# Patient Record
Sex: MALE | Race: WHITE | NOT HISPANIC OR LATINO | Employment: FULL TIME | URBAN - METROPOLITAN AREA
[De-identification: names, ages, dates, MRNs, and addresses within clinical notes are randomized per-mention and may not be internally consistent; named-entity substitution may affect disease eponyms.]

---

## 2024-01-22 ENCOUNTER — HOSPITAL ENCOUNTER (EMERGENCY)
Facility: HOSPITAL | Age: 39
Discharge: HOME | End: 2024-01-22
Attending: EMERGENCY MEDICINE
Payer: OTHER MISCELLANEOUS

## 2024-01-22 VITALS
HEART RATE: 93 BPM | TEMPERATURE: 98.3 F | DIASTOLIC BLOOD PRESSURE: 81 MMHG | SYSTOLIC BLOOD PRESSURE: 143 MMHG | OXYGEN SATURATION: 100 % | RESPIRATION RATE: 16 BRPM | WEIGHT: 229.28 LBS
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
## 2024-01-22 DIAGNOSIS — Z77.21 EXPOSURE TO BLOOD OR BODY FLUID: ICD-10-CM
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
## 2024-01-22 DIAGNOSIS — Z77.21 EXPOSURE TO BODY FLUIDS BY CONTAMINATED HYPODERMIC NEEDLE STICK: ICD-10-CM

## 2024-01-22 LAB
ALBUMIN SERPL BCP-MCNC: 4.9 G/DL (ref 3.5–5)
ALP SERPL-CCNC: 85 U/L (ref 34–104)
ALT SERPL W P-5'-P-CCNC: 36 U/L (ref 7–52)
ALT SERPL W P-5'-P-CCNC: 36 U/L (ref 7–52)
ANION GAP SERPL CALCULATED.3IONS-SCNC: 7 MMOL/L
AST SERPL W P-5'-P-CCNC: 31 U/L (ref 13–39)
BASOPHILS # BLD AUTO: 0.04 THOUSANDS/ÂΜL (ref 0–0.1)
BASOPHILS NFR BLD AUTO: 1 % (ref 0–1)
BILIRUB SERPL-MCNC: 0.48 MG/DL (ref 0.2–1)
BUN SERPL-MCNC: 18 MG/DL (ref 5–25)
CALCIUM SERPL-MCNC: 9.3 MG/DL (ref 8.4–10.2)
CHLORIDE SERPL-SCNC: 105 MMOL/L (ref 96–108)
CO2 SERPL-SCNC: 28 MMOL/L (ref 21–32)
CREAT SERPL-MCNC: 0.71 MG/DL (ref 0.6–1.3)
EOSINOPHIL # BLD AUTO: 0.06 THOUSAND/ÂΜL (ref 0–0.61)
EOSINOPHIL NFR BLD AUTO: 1 % (ref 0–6)
ERYTHROCYTE [DISTWIDTH] IN BLOOD BY AUTOMATED COUNT: 12.6 % (ref 11.6–15.1)
GFR SERPL CREATININE-BSD FRML MDRD: 117 ML/MIN/1.73SQ M
GLUCOSE SERPL-MCNC: 99 MG/DL (ref 65–140)
HBV CORE AB SER QL: NORMAL
HBV SURFACE AB SER-ACNC: <3 MIU/ML
HBV SURFACE AG SER QL: NORMAL
HCT VFR BLD AUTO: 44.2 % (ref 36.5–49.3)
HCV AB SER QL: NORMAL
HGB BLD-MCNC: 14.6 G/DL (ref 12–17)
HIV 1+2 AB+HIV1 P24 AG SERPL QL IA: NORMAL
HIV 2 AB SERPL QL IA: NORMAL
HIV1 AB SERPL QL IA: NORMAL
HIV1 P24 AG SERPL QL IA: NORMAL
IMM GRANULOCYTES # BLD AUTO: 0.03 THOUSAND/UL (ref 0–0.2)
IMM GRANULOCYTES NFR BLD AUTO: 0 % (ref 0–2)
LYMPHOCYTES # BLD AUTO: 1.14 THOUSANDS/ÂΜL (ref 0.6–4.47)
LYMPHOCYTES NFR BLD AUTO: 16 % (ref 14–44)
MCH RBC QN AUTO: 29.9 PG (ref 26.8–34.3)
MCHC RBC AUTO-ENTMCNC: 33 G/DL (ref 31.4–37.4)
MCV RBC AUTO: 90 FL (ref 82–98)
MONOCYTES # BLD AUTO: 0.6 THOUSAND/ÂΜL (ref 0.17–1.22)
MONOCYTES NFR BLD AUTO: 8 % (ref 4–12)
NEUTROPHILS # BLD AUTO: 5.49 THOUSANDS/ÂΜL (ref 1.85–7.62)
NEUTS SEG NFR BLD AUTO: 74 % (ref 43–75)
NRBC BLD AUTO-RTO: 0 /100 WBCS
PLATELET # BLD AUTO: 247 THOUSANDS/UL (ref 149–390)
PMV BLD AUTO: 10.9 FL (ref 8.9–12.7)
POTASSIUM SERPL-SCNC: 3.6 MMOL/L (ref 3.5–5.3)
PROT SERPL-MCNC: 7.7 G/DL (ref 6.4–8.4)
RBC # BLD AUTO: 4.89 MILLION/UL (ref 3.88–5.62)
SODIUM SERPL-SCNC: 140 MMOL/L (ref 135–147)
WBC # BLD AUTO: 7.36 THOUSAND/UL (ref 4.31–10.16)

## 2024-01-22 PROCEDURE — 80053 COMPREHEN METABOLIC PANEL: CPT

## 2024-01-22 PROCEDURE — 99283 EMERGENCY DEPT VISIT LOW MDM: CPT

## 2024-01-22 PROCEDURE — 90715 TDAP VACCINE 7 YRS/> IM: CPT

## 2024-01-22 PROCEDURE — 85025 COMPLETE CBC W/AUTO DIFF WBC: CPT

## 2024-01-22 PROCEDURE — 36415 COLL VENOUS BLD VENIPUNCTURE: CPT

## 2024-01-22 PROCEDURE — 96372 THER/PROPH/DIAG INJ SC/IM: CPT

## 2024-01-22 PROCEDURE — 90371 HEP B IG IM: CPT

## 2024-01-22 PROCEDURE — 87389 HIV-1 AG W/HIV-1&-2 AB AG IA: CPT

## 2024-01-22 PROCEDURE — 90471 IMMUNIZATION ADMIN: CPT

## 2024-01-22 PROCEDURE — 90746 HEPB VACCINE 3 DOSE ADULT IM: CPT

## 2024-01-22 PROCEDURE — 90472 IMMUNIZATION ADMIN EACH ADD: CPT

## 2024-01-22 PROCEDURE — 86706 HEP B SURFACE ANTIBODY: CPT

## 2024-01-22 PROCEDURE — 86704 HEP B CORE ANTIBODY TOTAL: CPT

## 2024-01-22 PROCEDURE — 87340 HEPATITIS B SURFACE AG IA: CPT

## 2024-01-22 PROCEDURE — 84460 ALANINE AMINO (ALT) (SGPT): CPT

## 2024-01-22 PROCEDURE — 86803 HEPATITIS C AB TEST: CPT

## 2024-01-22 RX ORDER — ONDANSETRON 4 MG/1
4 TABLET, ORALLY DISINTEGRATING ORAL EVERY 6 HOURS PRN
Qty: 30 TABLET | Refills: 0 | Status: SHIPPED | OUTPATIENT
Start: 2024-01-22 | End: 2024-02-21

## 2024-01-22 RX ORDER — BICTEGRAVIR SODIUM, EMTRICITABINE, AND TENOFOVIR ALAFENAMIDE FUMARATE 50; 200; 25 MG/1; MG/1; MG/1
1 TABLET ORAL
Qty: 22 TABLET | Refills: 0 | Status: SHIPPED | OUTPATIENT
Start: 2024-01-28 | End: 2024-02-19

## 2024-01-22 RX ADMIN — TETANUS TOXOID, REDUCED DIPHTHERIA TOXOID AND ACELLULAR PERTUSSIS VACCINE, ADSORBED 0.5 ML: 5; 2.5; 8; 8; 2.5 SUSPENSION INTRAMUSCULAR at 13:31

## 2024-01-22 RX ADMIN — Medication 1 EACH: at 15:10

## 2024-01-22 RX ADMIN — HEPATITIS B VACCINE (RECOMBINANT) 20 MCG: 20 INJECTION, SUSPENSION INTRAMUSCULAR at 15:13

## 2024-01-22 RX ADMIN — HEPATITIS B IMMUNE GLOBULIN (HUMAN) 6.2 ML: 1560 LIQUID INTRAMUSCULAR at 15:15

## 2024-01-22 NOTE — Clinical Note
Chan Saunders was seen and treated in our emergency department on 1/22/2024.                Diagnosis: Exposure to blood-borne    Chan  may return to work on return date.    He may return on this date: 01/23/2024         If you have any questions or concerns, please don't hesitate to call.      Roosevelt Penaloza PA-C    ______________________________           _______________          _______________  Hospital Representative                              Date                                Time

## 2024-01-22 NOTE — DISCHARGE INSTRUCTIONS
Patient advised to follow-up with PCP for follow-up appointments/continued immunization.  Patient advised to contact place of employment and follow-up proper paperwork for exposure to body fluids.  Patient should continue to follow blood-borne pathogen exposure.  Patient advised to take medication as prescribed.  Patient advised to return to the ED with any worsening redness, worsening pain, chest pain, shortness of breath, abdominal pain, visual disturbances, fever. Patient is advised to file Work Related Employee Injury/Illness report.

## 2024-01-22 NOTE — ED PROVIDER NOTES
History  Chief Complaint   Patient presents with    Body Fluid Exposure     Pt reports stuck by unknown needle at work on accident taking out someone's trash.      Patient is a 38-year-old male who presented to the ED with a chief complaint of needlestick injury 1 hour ago.  Stated that he was taking out some transfers work at [employer] when he put his opposing hand underneath a trash bag and felt a pinch in his finger.  He stated that right after he found out that this was a needle that stuck in the finger.  He said that he squeezed the blood out of his finger after the initial injury happened.  He then proceeded to rinse and wash his hands with soap and water for around 5 minutes prior to the injury.  He stated that the injury started bleeding immediately.  Patient states that he is not currently in any pain.  Patient stated there is a possibility that they could find the source patient from the needlestick but would have to go through camera videos from his workplace.  Patient is unsure of vaccine history.  Patient denies any shortness of breath, chest pain, abdominal pain, vomiting, diarrhea, fevers, visual disturbances.        None       History reviewed. No pertinent past medical history.    History reviewed. No pertinent surgical history.    History reviewed. No pertinent family history.  I have reviewed and agree with the history as documented.    E-Cigarette/Vaping     E-Cigarette/Vaping Substances     Social History     Tobacco Use    Smoking status: Never    Smokeless tobacco: Never       Review of Systems   Constitutional:  Negative for chills, diaphoresis, fatigue and fever.   HENT:  Negative for congestion, ear pain, postnasal drip, rhinorrhea, sinus pressure, sinus pain and sore throat.    Eyes:  Negative for pain and discharge.   Respiratory:  Negative for cough and shortness of breath.    Cardiovascular:  Negative for chest pain and palpitations.   Gastrointestinal:  Negative for  abdominal pain, blood in stool, constipation, diarrhea, nausea and vomiting.   Genitourinary:  Negative for difficulty urinating, dysuria, flank pain, hematuria, penile pain and testicular pain.   Musculoskeletal:  Negative for arthralgias, back pain, myalgias and neck pain.   Skin:  Positive for wound. Negative for color change and rash.   Neurological:  Negative for dizziness, syncope, light-headedness, numbness and headaches.       Physical Exam  Physical Exam  Vitals and nursing note reviewed.   Constitutional:       General: He is not in acute distress.     Appearance: Normal appearance. He is well-developed and normal weight. He is not ill-appearing, toxic-appearing or diaphoretic.   HENT:      Head: Normocephalic and atraumatic.      Right Ear: External ear normal.      Left Ear: External ear normal.      Nose: Nose normal.      Mouth/Throat:      Mouth: Mucous membranes are moist.   Eyes:      Conjunctiva/sclera: Conjunctivae normal.   Cardiovascular:      Rate and Rhythm: Normal rate and regular rhythm.      Pulses: Normal pulses.      Heart sounds: Normal heart sounds. No murmur heard.  Pulmonary:      Effort: Pulmonary effort is normal. No respiratory distress.      Breath sounds: Normal breath sounds.   Abdominal:      General: Bowel sounds are normal.      Palpations: Abdomen is soft.      Tenderness: There is no abdominal tenderness.   Musculoskeletal:         General: No swelling or tenderness. Normal range of motion.      Cervical back: Normal range of motion and neck supple.      Comments: The pin sites area of erythema on the right second digit.  Patient has the area circled with marker.  No extending erythema or active bleeding on exam.  Full range of motion and is neurovascularly intact.   Skin:     General: Skin is warm and dry.      Capillary Refill: Capillary refill takes less than 2 seconds.      Findings: Lesion present. No rash.   Neurological:      General: No focal deficit present.       Mental Status: He is alert and oriented to person, place, and time.   Psychiatric:         Mood and Affect: Mood normal.         Vital Signs  ED Triage Vitals [01/22/24 1211]   Temperature Pulse Respirations Blood Pressure SpO2   98.3 °F (36.8 °C) 93 16 143/81 100 %      Temp Source Heart Rate Source Patient Position - Orthostatic VS BP Location FiO2 (%)   Oral Monitor Sitting Right arm --      Pain Score       --           Vitals:    01/22/24 1211   BP: 143/81   Pulse: 93   Patient Position - Orthostatic VS: Sitting         Visual Acuity      ED Medications  Medications   tetanus-diphtheria-acellular pertussis (BOOSTRIX) IM injection 0.5 mL (0.5 mL Intramuscular Given 1/22/24 1331)   hepatitis B immune globulin (Nabi-HB) IM injection 6.2 mL (6.2 mL Intramuscular Given 1/22/24 1515)   hepatitis B vac (recombinant) IM injection 20 mcg (20 mcg Intramuscular Given 1/22/24 1513)   bictegravir-emtricitab-tenofovir alafenamide, ondansetron (Biktarvy, Zofran) PEP Dose Pack 1 each (1 each Does not apply Given 1/22/24 1510)       Diagnostic Studies  Results Reviewed       Procedure Component Value Units Date/Time    Hepatitis B surface antibody [[id number]] Collected: 01/22/24 1316    Lab Status: Final result Specimen: Blood from Arm, Left Updated: 01/22/24 2303     Hep B S Ab <3.00 mIU/mL     Hepatitis B core antibody, total [[id number]]  (Normal) Collected: 01/22/24 1316    Lab Status: Final result Specimen: Blood from Arm, Left Updated: 01/22/24 2253     Hep B Core Total Ab Non-reactive    Hepatitis C antibody [[id number]]  (Normal) Collected: 01/22/24 1316    Lab Status: Final result Specimen: Blood from Arm, Left Updated: 01/22/24 2121     Hepatitis C Ab Non-reactive    Hepatitis B surface antigen [[id number]]  (Normal) Collected: 01/22/24 1316    Lab Status: Final result Specimen: Blood from Arm, Left Updated: 01/22/24 2120     Hepatitis B Surface Ag Non-reactive    HIV 1/2 AG/AB w Reflex [facility] for 2 yr old and above  [[id number]]  (Normal) Collected: 01/22/24 1316    Lab Status: Final result Specimen: Blood from Arm, Left Updated: 01/22/24 2115     HIV-1 p24 Antigen Non-Reactive     HIV-1 Antibody Non-Reactive     HIV-2 Antibody Non-Reactive     HIV Ag-Ab 5th Gen Non-Reactive    Narrative:      This 5th generation HIV Ag-Ab assay is a multiplex flow immunoassay intended for qualitative detection and differentiation of HIV-1 p24 antigen, HIV-1 (groups M and O) antibodies, and HIV-2 antibodies in human serum.  This assay is intended as an aid in the diagnosis of infection with HIV-1 and/or HIV-2, including acute (primary) HIV-1 infection. The test is validated for adult patients, including pregnant women, and in pediatric patients as young as two years of age. The performance of this assay has not been established for neonates.      Comprehensive metabolic panel [[id number]] Collected: 01/22/24 1359    Lab Status: Final result Specimen: Blood from Hand, Right Updated: 01/22/24 1437     Sodium 140 mmol/L      Potassium 3.6 mmol/L      Chloride 105 mmol/L      CO2 28 mmol/L      ANION GAP 7 mmol/L      BUN 18 mg/dL      Creatinine 0.71 mg/dL      Glucose 99 mg/dL      Calcium 9.3 mg/dL      AST 31 U/L      ALT 36 U/L      Alkaline Phosphatase 85 U/L      Total Protein 7.7 g/dL      Albumin 4.9 g/dL      Total Bilirubin 0.48 mg/dL      eGFR 117 ml/min/1.73sq m     Narrative:      National Kidney Disease Foundation guidelines for Chronic Kidney Disease (CKD):     Stage 1 with normal or high GFR (GFR > 90 mL/min/1.73 square meters)    Stage 2 Mild CKD (GFR = 60-89 mL/min/1.73 square meters)    Stage 3A Moderate CKD (GFR = 45-59 mL/min/1.73 square meters)    Stage 3B Moderate CKD (GFR = 30-44 mL/min/1.73 square meters)    Stage 4 Severe CKD (GFR = 15-29 mL/min/1.73 square meters)    Stage 5 End Stage CKD (GFR <15 mL/min/1.73 square meters)  Note: GFR calculation is accurate only with a steady state creatinine    CBC and differential  [[id number]] Collected: 01/22/24 1359    Lab Status: Final result Specimen: Blood from Hand, Right Updated: 01/22/24 1406     WBC 7.36 Thousand/uL      RBC 4.89 Million/uL      Hemoglobin 14.6 g/dL      Hematocrit 44.2 %      MCV 90 fL      MCH 29.9 pg      MCHC 33.0 g/dL      RDW 12.6 %      MPV 10.9 fL      Platelets 247 Thousands/uL      nRBC 0 /100 WBCs      Neutrophils Relative 74 %      Immat GRANS % 0 %      Lymphocytes Relative 16 %      Monocytes Relative 8 %      Eosinophils Relative 1 %      Basophils Relative 1 %      Neutrophils Absolute 5.49 Thousands/µL      Immature Grans Absolute 0.03 Thousand/uL      Lymphocytes Absolute 1.14 Thousands/µL      Monocytes Absolute 0.60 Thousand/µL      Eosinophils Absolute 0.06 Thousand/µL      Basophils Absolute 0.04 Thousands/µL     ALT [[id number]]  (Normal) Collected: 01/22/24 1316    Lab Status: Final result Specimen: Blood from Arm, Left Updated: 01/22/24 1348     ALT 36 U/L                    No orders to display              Procedures  Procedures         ED Course                                             Medical Decision Making  Patient is a 38-year-old male who presented to the ED with a needlestick injury.  Was picking up trash at work and put his hand underneath a trash bag and noticed that he was stuck with a needle.  Physical exam shows pinhole sized area of erythema.  No extension of the erythema.  No bleeding on exam.  Patient stated that bleeding was stopped immediately after.  Patient stated that he squeeze blood out of the lesion initially when exposed.  Stated that he washes hands for around 5 minutes with soap and water after the initial needlestick injury.  Patient had the needle that he was stuck with.  Needle as a pen needle used for subcutaneous injection of insulin.  Patient denies known source.  Stated that he may be able to find the source if his work were able to track the cameras to see who is trash the needle was in.  I consulted  on-call infectious disease who stated to follow blood-borne pathogen exposure.  Due to not having a source and patient not having known vaccine status for hepatitis B.  Hepatitis immunoglobulin/hepatitis B vaccination given in the ED.  Patient told to follow-up with PCP/urgent care in a month for next hepatitis B vaccination.  Patient also given tetanus vaccination as the last tetanus was 9 years ago.  Patient started on PEP prophylaxis for possible HIV infection.  Postexposure labs ordered on patient.  Patient was told to follow-up with his work to make sure all paperwork for postexposure injury/illness is filed.  Patient told to call PCP to follow-up for postexposure prophylaxis.  Patient also given prescription for Zofran for possibility of nausea with postexposure medications.  Patient told that any positive results will be relayed to him.  Patient understood diagnosis and treatment plan and had no further questions.  Outpatient medication sent to the patient pharmacy.  Patient was discharged in stable condition.  Patient was told to return to the ED with any fevers, abdominal pain, vomiting, diarrhea, increasing erythema, drainage, chest pain, shortness of breath, and worsening conditions.    Ddx-needlestick injury, blood-borne pathogen exposure, hepatitis B, hepatitis C, HIV    Amount and/or Complexity of Data Reviewed  External Data Reviewed: labs and notes.  Labs: ordered.     Details: Postexposure labs, baseline CBC CMP,  Discussion of management or test interpretation with external provider(s): Infectious disease was consulted during visit via [facility].    Risk  OTC drugs.  Prescription drug management.             Disposition  Final diagnoses:   Exposure to body fluids by contaminated hypodermic needle stick   Exposure to blood or body fluid   Needlestick injury accident with exposure to body fluid     Time reflects when diagnosis was documented in both MDM as applicable and the Disposition within  this note       Time User Action Codes Description Comment    1/22/2024  1:41 PM Ca Roosevelt Add [Z77.21,  W46.1XXA] Exposure to body fluids by contaminated hypodermic needle stick     1/22/2024  1:49 PM Roosevelt Penaloza Add [Z77.21] Exposure to blood or body fluid     1/22/2024  1:49 PM Roosevelt Penaloza Add [W46.1XXA] Needlestick injury accident with exposure to body fluid     1/22/2024  1:49 PM Roosevelt Penaloza Modify [Z77.21,  W46.1XXA] Exposure to body fluids by contaminated hypodermic needle stick     1/22/2024  1:49 PM Roosevelt Penaloza Modify [W46.1XXA] Needlestick injury accident with exposure to body fluid           ED Disposition       ED Disposition   Discharge    Condition   Stable    Date/Time   Mon Jan 22, 2024  1:49 PM    Comment   Chan Badilloillo discharge to home/self care.                   Follow-up Information       Follow up With Specialties Details Why Contact Info Additional Information    Slick Darden MD Internal Medicine Call today blood bourne pathogen exposure [address] [place] [address] [phone]       [facility]   [address]  [facility] [address]  [phone] [facility], [address] [place] [address], [place], [place], [address]   [phone]    [facility] Call today  [address] [facility] [id number] [phone] [facility], [address] [place] [address], [place], [address], [phone]            Discharge Medication List as of 1/22/2024  3:31 PM        START taking these medications    Details   bictegravir-emtricitab-tenofovir alafenamide (Biktarvy) -25 MG tablet Take 1 tablet by mouth daily with breakfast for 22 days Do not start before January 28, 2024., Starting Sun 1/28/2024, Until Mon 2/19/2024, Normal      ondansetron (ZOFRAN-ODT) 4 mg disintegrating tablet  Take 1 tablet (4 mg total) by mouth every 6 (six) hours as needed for nausea or vomiting, Starting Mon 1/22/2024, Until Wed 2/21/2024 at 2359, Normal             No discharge procedures on file.    PDMP Review       None            ED Provider  Electronically Signed by             Roosevelt Penaloza PA-C  01/24/24 0792

## 2024-02-14 ENCOUNTER — APPOINTMENT (OUTPATIENT)
Dept: LAB | Facility: MEDICAL CENTER | Age: 39
End: 2024-02-14

## 2024-02-14 ENCOUNTER — OCCMED (OUTPATIENT)
Dept: URGENT CARE | Facility: MEDICAL CENTER | Age: 39
End: 2024-02-14
Payer: OTHER MISCELLANEOUS

## 2024-02-14 DIAGNOSIS — Z77.21 EXPOSURE TO BLOOD-BORNE PATHOGEN: ICD-10-CM

## 2024-02-14 DIAGNOSIS — Z77.21 EXPOSURE TO BLOOD-BORNE PATHOGEN: Primary | ICD-10-CM

## 2024-02-14 LAB
ALBUMIN SERPL BCP-MCNC: 4.6 G/DL (ref 3.5–5)
ALP SERPL-CCNC: 80 U/L (ref 34–104)
ALT SERPL W P-5'-P-CCNC: 32 U/L (ref 7–52)
AST SERPL W P-5'-P-CCNC: 29 U/L (ref 13–39)
BASOPHILS # BLD AUTO: 0.03 THOUSANDS/ÂΜL (ref 0–0.1)
BASOPHILS NFR BLD AUTO: 1 % (ref 0–1)
BILIRUB DIRECT SERPL-MCNC: 0.14 MG/DL (ref 0–0.2)
BILIRUB SERPL-MCNC: 0.57 MG/DL (ref 0.2–1)
BUN SERPL-MCNC: 15 MG/DL (ref 5–25)
CREAT UR-MCNC: 107.5 MG/DL
EOSINOPHIL # BLD AUTO: 0.12 THOUSAND/ÂΜL (ref 0–0.61)
EOSINOPHIL NFR BLD AUTO: 2 % (ref 0–6)
ERYTHROCYTE [DISTWIDTH] IN BLOOD BY AUTOMATED COUNT: 13.4 % (ref 11.6–15.1)
HCT VFR BLD AUTO: 40.6 % (ref 36.5–49.3)
HGB BLD-MCNC: 13.6 G/DL (ref 12–17)
IMM GRANULOCYTES # BLD AUTO: 0.03 THOUSAND/UL (ref 0–0.2)
IMM GRANULOCYTES NFR BLD AUTO: 1 % (ref 0–2)
LYMPHOCYTES # BLD AUTO: 1.64 THOUSANDS/ÂΜL (ref 0.6–4.47)
LYMPHOCYTES NFR BLD AUTO: 29 % (ref 14–44)
MCH RBC QN AUTO: 30.4 PG (ref 26.8–34.3)
MCHC RBC AUTO-ENTMCNC: 33.5 G/DL (ref 31.4–37.4)
MCV RBC AUTO: 91 FL (ref 82–98)
MICROALBUMIN UR-MCNC: <7 MG/L
MICROALBUMIN/CREAT 24H UR: <7 MG/G CREATININE (ref 0–30)
MONOCYTES # BLD AUTO: 0.67 THOUSAND/ÂΜL (ref 0.17–1.22)
MONOCYTES NFR BLD AUTO: 12 % (ref 4–12)
NEUTROPHILS # BLD AUTO: 3.26 THOUSANDS/ÂΜL (ref 1.85–7.62)
NEUTS SEG NFR BLD AUTO: 55 % (ref 43–75)
NRBC BLD AUTO-RTO: 0 /100 WBCS
PLATELET # BLD AUTO: 217 THOUSANDS/UL (ref 149–390)
PMV BLD AUTO: 11.7 FL (ref 8.9–12.7)
PROT SERPL-MCNC: 6.9 G/DL (ref 6.4–8.4)
RBC # BLD AUTO: 4.47 MILLION/UL (ref 3.88–5.62)
WBC # BLD AUTO: 5.75 THOUSAND/UL (ref 4.31–10.16)

## 2024-02-14 PROCEDURE — G0382 LEV 3 HOSP TYPE B ED VISIT: HCPCS

## 2024-02-14 PROCEDURE — 85025 COMPLETE CBC W/AUTO DIFF WBC: CPT

## 2024-02-14 PROCEDURE — 82570 ASSAY OF URINE CREATININE: CPT

## 2024-02-14 PROCEDURE — 82043 UR ALBUMIN QUANTITATIVE: CPT

## 2024-02-14 PROCEDURE — 99283 EMERGENCY DEPT VISIT LOW MDM: CPT

## 2024-02-14 PROCEDURE — 80076 HEPATIC FUNCTION PANEL: CPT

## 2024-02-14 PROCEDURE — 36415 COLL VENOUS BLD VENIPUNCTURE: CPT

## 2024-02-14 PROCEDURE — 84520 ASSAY OF UREA NITROGEN: CPT

## 2024-02-23 ENCOUNTER — APPOINTMENT (OUTPATIENT)
Dept: LAB | Facility: MEDICAL CENTER | Age: 39
End: 2024-02-23
Payer: OTHER MISCELLANEOUS

## 2024-02-23 ENCOUNTER — APPOINTMENT (OUTPATIENT)
Dept: URGENT CARE | Facility: MEDICAL CENTER | Age: 39
End: 2024-02-23
Payer: OTHER MISCELLANEOUS
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
## 2024-02-23 DIAGNOSIS — Z77.21 PERSONAL HISTORY OF EXPOSURE TO POTENTIALLY HAZARDOUS BODY FLUIDS: ICD-10-CM

## 2024-02-23 LAB
ALBUMIN SERPL BCP-MCNC: 4.9 G/DL (ref 3.5–5)
ALP SERPL-CCNC: 90 U/L (ref 34–104)
ALT SERPL W P-5'-P-CCNC: 34 U/L (ref 7–52)
AST SERPL W P-5'-P-CCNC: 29 U/L (ref 13–39)
BILIRUB DIRECT SERPL-MCNC: 0.17 MG/DL (ref 0–0.2)
BILIRUB SERPL-MCNC: 0.4 MG/DL (ref 0.2–1)
BUN SERPL-MCNC: 28 MG/DL (ref 5–25)
CREAT SERPL-MCNC: 0.93 MG/DL (ref 0.6–1.3)
ERYTHROCYTE [DISTWIDTH] IN BLOOD BY AUTOMATED COUNT: 13.2 % (ref 11.6–15.1)
GFR SERPL CREATININE-BSD FRML MDRD: 102 ML/MIN/1.73SQ M
HCT VFR BLD AUTO: 42.9 % (ref 36.5–49.3)
HGB BLD-MCNC: 14.3 G/DL (ref 12–17)
MCH RBC QN AUTO: 30.2 PG (ref 26.8–34.3)
MCHC RBC AUTO-ENTMCNC: 33.3 G/DL (ref 31.4–37.4)
MCV RBC AUTO: 91 FL (ref 82–98)
PLATELET # BLD AUTO: 244 THOUSANDS/UL (ref 149–390)
PMV BLD AUTO: 11.8 FL (ref 8.9–12.7)
PROT SERPL-MCNC: 6.9 G/DL (ref 6.4–8.4)
RBC # BLD AUTO: 4.73 MILLION/UL (ref 3.88–5.62)
WBC # BLD AUTO: 7.49 THOUSAND/UL (ref 4.31–10.16)

## 2024-02-23 PROCEDURE — 82565 ASSAY OF CREATININE: CPT

## 2024-02-23 PROCEDURE — 90471 IMMUNIZATION ADMIN: CPT

## 2024-02-23 PROCEDURE — 80076 HEPATIC FUNCTION PANEL: CPT

## 2024-02-23 PROCEDURE — 36415 COLL VENOUS BLD VENIPUNCTURE: CPT

## 2024-02-23 PROCEDURE — 84520 ASSAY OF UREA NITROGEN: CPT

## 2024-02-23 PROCEDURE — 90746 HEPB VACCINE 3 DOSE ADULT IM: CPT

## 2024-02-23 PROCEDURE — 85027 COMPLETE CBC AUTOMATED: CPT

## 2024-02-23 PROCEDURE — 99213 OFFICE O/P EST LOW 20 MIN: CPT

## 2024-03-07 ENCOUNTER — APPOINTMENT (OUTPATIENT)
Dept: LAB | Facility: MEDICAL CENTER | Age: 39
End: 2024-03-07
Payer: OTHER MISCELLANEOUS
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
## 2024-03-07 DIAGNOSIS — Z77.21 PERSONAL HISTORY OF EXPOSURE TO POTENTIALLY HAZARDOUS BODY FLUIDS: ICD-10-CM

## 2024-03-07 LAB — ALT SERPL W P-5'-P-CCNC: 36 U/L (ref 7–52)

## 2024-03-07 PROCEDURE — 36415 COLL VENOUS BLD VENIPUNCTURE: CPT

## 2024-03-07 PROCEDURE — 87389 HIV-1 AG W/HIV-1&-2 AB AG IA: CPT

## 2024-03-07 PROCEDURE — 86803 HEPATITIS C AB TEST: CPT

## 2024-03-07 PROCEDURE — 87340 HEPATITIS B SURFACE AG IA: CPT

## 2024-03-07 PROCEDURE — 84460 ALANINE AMINO (ALT) (SGPT): CPT

## 2024-03-08 LAB
HBV SURFACE AG SER QL: NORMAL
HCV AB SER QL: NORMAL
HIV 1+2 AB+HIV1 P24 AG SERPL QL IA: NORMAL
HIV 2 AB SERPL QL IA: NORMAL
HIV1 AB SERPL QL IA: NORMAL
HIV1 P24 AG SERPL QL IA: NORMAL

## 2024-03-12 ENCOUNTER — APPOINTMENT (OUTPATIENT)
Dept: URGENT CARE | Facility: MEDICAL CENTER | Age: 39
End: 2024-03-12
Payer: OTHER MISCELLANEOUS

## 2024-03-12 PROCEDURE — 99213 OFFICE O/P EST LOW 20 MIN: CPT

## 2024-05-03 ENCOUNTER — APPOINTMENT (OUTPATIENT)
Dept: LAB | Facility: MEDICAL CENTER | Age: 39
End: 2024-05-03
Payer: OTHER MISCELLANEOUS

## 2024-05-03 DIAGNOSIS — Z77.21 PERSONAL HISTORY OF EXPOSURE TO POTENTIALLY HAZARDOUS BODY FLUIDS: ICD-10-CM
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
## 2024-05-03 LAB — ALT SERPL W P-5'-P-CCNC: 26 U/L (ref 7–52)

## 2024-05-03 PROCEDURE — 36415 COLL VENOUS BLD VENIPUNCTURE: CPT

## 2024-05-03 PROCEDURE — 86803 HEPATITIS C AB TEST: CPT

## 2024-05-03 PROCEDURE — 87389 HIV-1 AG W/HIV-1&-2 AB AG IA: CPT

## 2024-05-03 PROCEDURE — 87340 HEPATITIS B SURFACE AG IA: CPT

## 2024-05-03 PROCEDURE — 84460 ALANINE AMINO (ALT) (SGPT): CPT

## 2024-05-07 ENCOUNTER — APPOINTMENT (OUTPATIENT)
Dept: URGENT CARE | Facility: MEDICAL CENTER | Age: 39
End: 2024-05-07
Payer: OTHER MISCELLANEOUS

## 2024-05-07 PROCEDURE — 99213 OFFICE O/P EST LOW 20 MIN: CPT | Performed by: FAMILY MEDICINE
